# Patient Record
Sex: MALE | Race: WHITE
[De-identification: names, ages, dates, MRNs, and addresses within clinical notes are randomized per-mention and may not be internally consistent; named-entity substitution may affect disease eponyms.]

---

## 2020-03-14 ENCOUNTER — HOSPITAL ENCOUNTER (EMERGENCY)
Dept: HOSPITAL 56 - MW.ED | Age: 55
Discharge: HOME | End: 2020-03-14
Payer: COMMERCIAL

## 2020-03-14 DIAGNOSIS — I10: ICD-10-CM

## 2020-03-14 DIAGNOSIS — Z79.899: ICD-10-CM

## 2020-03-14 DIAGNOSIS — J18.9: Primary | ICD-10-CM

## 2020-03-14 DIAGNOSIS — Z53.20: ICD-10-CM

## 2020-03-14 LAB
BUN SERPL-MCNC: 10 MG/DL (ref 7–18)
CHLORIDE SERPL-SCNC: 102 MMOL/L (ref 98–107)
CO2 SERPL-SCNC: 26.9 MMOL/L (ref 21–32)
GLUCOSE SERPL-MCNC: 340 MG/DL (ref 74–106)
POTASSIUM SERPL-SCNC: 4.2 MMOL/L (ref 3.5–5.1)
SODIUM SERPL-SCNC: 139 MMOL/L (ref 136–148)

## 2020-03-14 PROCEDURE — 87804 INFLUENZA ASSAY W/OPTIC: CPT

## 2020-03-14 PROCEDURE — 71046 X-RAY EXAM CHEST 2 VIEWS: CPT

## 2020-03-14 PROCEDURE — 96365 THER/PROPH/DIAG IV INF INIT: CPT

## 2020-03-14 PROCEDURE — 96375 TX/PRO/DX INJ NEW DRUG ADDON: CPT

## 2020-03-14 PROCEDURE — 93005 ELECTROCARDIOGRAM TRACING: CPT

## 2020-03-14 PROCEDURE — 84484 ASSAY OF TROPONIN QUANT: CPT

## 2020-03-14 PROCEDURE — 99284 EMERGENCY DEPT VISIT MOD MDM: CPT

## 2020-03-14 PROCEDURE — 87880 STREP A ASSAY W/OPTIC: CPT

## 2020-03-14 PROCEDURE — 94640 AIRWAY INHALATION TREATMENT: CPT

## 2020-03-14 PROCEDURE — 36415 COLL VENOUS BLD VENIPUNCTURE: CPT

## 2020-03-14 PROCEDURE — 87081 CULTURE SCREEN ONLY: CPT

## 2020-03-14 PROCEDURE — 80053 COMPREHEN METABOLIC PANEL: CPT

## 2020-03-14 PROCEDURE — 85025 COMPLETE CBC W/AUTO DIFF WBC: CPT

## 2020-03-14 NOTE — EDM.PDOC
ED HPI GENERAL MEDICAL PROBLEM





- General


Chief Complaint: General


Stated Complaint: COLD


Time Seen by Provider: 03/14/20 11:18


Source of Information: Reports: Patient


History Limitations: Reports: No Limitations





- History of Present Illness


INITIAL COMMENTS - FREE TEXT/NARRATIVE: 


HISTORY AND PHYSICAL:





History of present illness:


Patient is a 55-year-old male who presents to the emergency room today with 

complaints of cough and chest tightness x2 weeks.  He states that he is 

coughing up foul-smelling sputum.  States he has had pneumonia in the past and 

is concerned if his respiratory symptoms are left untreated he could have 

pneumonia again.  Patient states he just had a occupational health evaluation 

and at that time "everything was fine".  He states that he did have borderline 

hypertension but states that he was not needing any form of treatment but was 

told to keep an eye on it.  Patient denies any fever, chills, headache, change 

in vision, syncope or near syncope. Denies any chest pain, back pain. Denies 

any abdominal pain, nausea, vomiting, diarrhea, constipation or dysuria. 

Patient has been eating and drinking appropriately.





Review of systems: 


As per history of present illness and below otherwise all systems reviewed and 

negative.





Past medical history: 


As per history of present illness and as reviewed below otherwise 

noncontributory.





Surgical history: 


As per history of present illness and as reviewed below otherwise 

noncontributory.





Social history: 


See social history for further information





Family history: 


As per history of present illness and as reviewed below otherwise 

noncontributory.





Physical exam:


General: Well-developed and well-nourished 55-year-old male.  Alert and 

oriented.  Nontoxic-appearing and in no acute distress.


HEENT: Atraumatic, normocephalic, pupils equal and reactive bilaterally, 

negative for conjunctival pallor or scleral icterus, mucous membranes moist, 

TMs normal bilaterally, throat clear, neck supple, nontender, trachea midline. 

No drooling or trismus noted. No meningeal signs. No hot potato voice noted. 


Lungs: Poor air exchange and diminished throughout, breath sounds equal 

bilaterally, chest nontender.


Heart: S1S2, regular rate and rhythm without overt murmur


Abdomen: Soft, nondistended, nontender. Negative for masses or 

hepatosplenomegaly. Negative for costovertebral tenderness.


Skin: Intact, warm, dry. No lesions or rashes noted.


Extremities: Atraumatic, moves all extremities per self without difficulty or 

deficits, negative for cords or calf pain. Neurovascular unremarkable.


Neuro: Awake, alert, oriented. Cranial nerves II through XII unremarkable. 

Cerebellum unremarkable. Motor and sensory unremarkable throughout. Exam 

nonfocal.





Notes:


With the recent COVID-19 outbreak patient has been screened: patient denies any 

recent travel to high risk areas or exposure to anyone who recently tested or 

is begin tested for COVID-19. Patient is at low/moderate risk (because absent 

fever and no travel).  With his influenza and chest x-ray reading is normal we 

did discuss testing for this (upper respiratory illness without definitive cause

).  He declines wanting any testing even though it was offered.





Not much lung improvement after the DuoNeb.  Patient does have a leukocytosis 

which I am going to treat as an atypical pneumonia.  I did request that we 

admit him for further care and management as his blood pressure is currently 

not under control.  He is refusing any further medications (refused IV labetolol

) and would like to be discharged to home.  We discussed in great lengths the 

risks of uncontrolled hypertension including but not limited to heart attack, 

stroke and even death.  He is aware of these risks and still would like to be 

discharged.  I am going to give him prescriptions for home and we reviewed and 

discussed signs and symptoms that would prompt him to return to the emergency 

room.





Diagnostics:


CBC, CMP, Troponin, EKG, CXR, Influneza, Strep





Therapeutics:


IV fluids, Duo Neb, Rocephin, Clonidine, Solu-Medrol





Prescription:


Zpak


Medrol Dosepak


Pro-Air Inhaler


Lisinopril





Impression: 


Atypical pneumonia


Uncontrolled hypertension


Left against medical advice





Plan:


1. Unmanaged blood pressure puts you at increased risk for heart attack and 

stroke. Your blood pressure is dangerously high. DO NOT take any over the 

counter cough and cold mediations as these can increase your blood pressure 

even more. I would like you to monitor your blood pressure routinely and be re-

evaluated by your primary care provider as your medication may need to be 

adjusted.


2. If your symptoms do not improve, new symptoms develop, or current symptoms 

worsen- RETURN to the Emergency Room. 


3. Alternate Tylenol and Ibuprofen as needed.


4. Follow up with primary care next week. Return to the ED as needed as 

discussed. 





Definitive disposition and diagnosis as appropriate pending reevaluation and 

review of above.








- Related Data


 Allergies











Allergy/AdvReac Type Severity Reaction Status Date / Time


 


No Known Allergies Allergy   Verified 03/14/20 11:26











Home Meds: 


 Home Meds





Albuterol Sulfate [Proair Hfa] 2 puff IH Q4HR #1 hfa.aer.ad 03/14/20 [Rx]


Azithromycin [Zithromax] 1 dose PO DAILY 5 Days #6 tab 03/14/20 [Rx]


lisinopriL [Lisinopril] 10 mg PO DAILY #30 tablet 03/14/20 [Rx]


methylPREDNISolone [Medrol] 1 dose PO DAILY 6 Days #1 dospk 03/14/20 [Rx]











Past Medical History





- Past Health History


Medical/Surgical History: Denies Medical/Surgical History





Social & Family History





- Family History


Family Medical History: Noncontributory





- Tobacco Use


Smoking Status *Q: Never Smoker





- Recreational Drug Use


Recreational Drug Use: No





ED ROS GENERAL





- Review of Systems


Review Of Systems: Comprehensive ROS is negative, except as noted in HPI.





ED EXAM, GENERAL





- Physical Exam


Exam: See Below (See dictation)





Course





- Vital Signs


Last Recorded V/S: 


 Last Vital Signs











Temp  97.8 F   03/14/20 13:24


 


Pulse  90   03/14/20 13:24


 


Resp  18   03/14/20 13:24


 


BP  179/104 H  03/14/20 13:24


 


Pulse Ox  94 L  03/14/20 13:24














- Orders/Labs/Meds


Orders: 


 Active Orders 24 hr











 Category Date Time Status


 


 EKG Documentation Completion [RC] STAT Care  03/14/20 11:35 Active


 


 RT Aerosol Therapy [RC] ASDIRECTED Care  03/14/20 11:41 Active


 


 CULTURE STREP A CONFIRMATION [RM] Stat Lab  03/14/20 11:32 Results


 


 STREP SCRN A RAPID W CULT CONF [RM] Stat Lab  03/14/20 11:32 Results


 


 Isolation [COMM] Routine Oth  03/14/20 11:32 Active


 


 Saline Lock Insert [OM.PC] Stat Oth  03/14/20 11:35 Ordered











Labs: 


 Laboratory Tests











  03/14/20 03/14/20 Range/Units





  11:55 11:55 


 


WBC  13.16 H   (4.0-11.0)  K/uL


 


RBC  5.13   (4.50-5.90)  M/uL


 


Hgb  15.7   (13.0-17.0)  g/dL


 


Hct  45.5   (38.0-50.0)  %


 


MCV  88.7   (80.0-98.0)  fL


 


MCH  30.6   (27.0-32.0)  pg


 


MCHC  34.5   (31.0-37.0)  g/dL


 


RDW Std Deviation  42.3   (28.0-62.0)  fl


 


RDW Coeff of Ashely  13   (11.0-15.0)  %


 


Plt Count  258   (150-400)  K/uL


 


MPV  9.30   (7.40-12.00)  fL


 


Neut % (Auto)  74.9   (48.0-80.0)  %


 


Lymph % (Auto)  16.0   (16.0-40.0)  %


 


Mono % (Auto)  6.8   (0.0-15.0)  %


 


Eos % (Auto)  2.1   (0.0-7.0)  %


 


Baso % (Auto)  0.2   (0.0-1.5)  %


 


Neut # (Auto)  9.9 H   (1.4-5.7)  K/uL


 


Lymph # (Auto)  2.1   (0.6-2.4)  K/uL


 


Mono # (Auto)  0.9 H   (0.0-0.8)  K/uL


 


Eos # (Auto)  0.3   (0.0-0.7)  K/uL


 


Baso # (Auto)  0.0   (0.0-0.1)  K/uL


 


Nucleated RBC %  0.0   /100WBC


 


Nucleated RBCs #  0   K/uL


 


Sodium   139  (136-148)  mmol/L


 


Potassium   4.2  (3.5-5.1)  mmol/L


 


Chloride   102  ()  mmol/L


 


Carbon Dioxide   26.9  (21.0-32.0)  mmol/L


 


BUN   10  (7.0-18.0)  mg/dL


 


Creatinine   0.9  (0.8-1.3)  mg/dL


 


Est Cr Clr Drug Dosing   110.36  mL/min


 


Estimated GFR (MDRD)   > 60.0  ml/min


 


Glucose   340 H  ()  mg/dL


 


Calcium   8.7  (8.5-10.1)  mg/dL


 


Total Bilirubin   0.7  (0.2-1.0)  mg/dL


 


AST   45 H  (15-37)  IU/L


 


ALT   98 H  (14-63)  IU/L


 


Alkaline Phosphatase   66  ()  U/L


 


Troponin I   < 0.050  (0.000-0.056)  ng/mL


 


Total Protein   7.5  (6.4-8.2)  g/dL


 


Albumin   3.3 L  (3.4-5.0)  g/dL


 


Globulin   4.2 H  (2.6-4.0)  g/dL


 


Albumin/Globulin Ratio   0.8 L  (0.9-1.6)  











Meds: 


Medications














Discontinued Medications














Generic Name Dose Route Start Last Admin





  Trade Name Freq  PRN Reason Stop Dose Admin


 


Albuterol/Ipratropium  3 ml  03/14/20 11:41  03/14/20 11:49





  Duoneb 3.0-0.5 Mg/3 Ml  NEB  03/14/20 11:42  3 ml





  ONETIME ONE   Administration





     





     





     





     


 


Clonidine HCl  0.1 mg  03/14/20 12:26  03/14/20 12:33





  Catapres  PO  03/14/20 12:27  0.1 mg





  ONETIME ONE   Administration





     





     





     





     


 


Ceftriaxone Sodium/Dextrose 1  50 mls @ 100 mls/hr  03/14/20 12:25  03/14/20 12:

33





  gm/ Premix  IV  03/14/20 12:54  100 mls/hr





  ONETIME ONE   Administration





     





     





     





     


 


Methylprednisolone Sodium Succinate  125 mg  03/14/20 12:25  03/14/20 12:33





  Solu-Medrol  IVPUSH  03/14/20 12:26  125 mg





  ONETIME ONE   Administration





     





     





     





     


 


Sodium Chloride  10 ml  03/14/20 11:35  03/14/20 12:00





  Saline Flush  FLUSH   10 ml





  ASDIRECTED PRN   Administration





  Keep Vein Open   





     





     





     


 


Sodium Chloride  2.5 ml  03/14/20 11:35  03/14/20 12:00





  Saline Flush  FLUSH   2.5 ml





  ASDIRECTED PRN   Administration





  Keep Vein Open   





     





     





     














Departure





- Departure


Time of Disposition: 12:50


Disposition: Home, Self-Care 01


Clinical Impression: 


 Left against medical advice, Uncontrolled hypertension, Atypical pneumonia








- Discharge Information


Prescriptions: 


Albuterol Sulfate [Proair Hfa] 2 puff IH Q4HR #1 hfa.aer.ad


Azithromycin [Zithromax] 1 dose PO DAILY 5 Days #6 tab


lisinopriL [Lisinopril] 10 mg PO DAILY #30 tablet


methylPREDNISolone [Medrol] 1 dose PO DAILY 6 Days #1 dospk


Instructions:  Hypertension, Easy-to-Read, Community-Acquired Pneumonia, Adult, 

Easy-to-Read


Referrals: 


PCP,None [Primary Care Provider] - 


Forms:  ED Department Discharge


Additional Instructions: 


The following information is given to patients seen in the emergency department 

who are being discharged to home. This information is to outline your options 

for follow-up care. We provide all patients seen in our emergency department 

with a follow-up referral.





The need for follow-up, as well as the timing and circumstances, are variable 

depending upon the specifics of your emergency department visit.





If you don't have a primary care physician on staff, we will provide you with a 

referral. We always advise you to contact your personal physician following an 

emergency department visit to inform them of the circumstance of the visit and 

for follow-up with them and/or the need for any referrals to a consulting 

specialist.





The emergency department will also refer you to a specialist when appropriate. 

This referral assures that you have the opportunity for follow-up care with a 

specialist. All of these measure are taken in an effort to provide you with 

optimal care, which includes your follow-up.





Under all circumstances we always encourage you to contact your private 

physician who remains a resource for coordinating your care. When calling for 

follow-up care, please make the office aware that this follow-up is from your 

recent emergency room visit. If for any reason you are refused follow-up, 

please contact the [facility] Emergency 

Department at [phone] and asked to speak to the emergency department 

charge nurse.





[facility]


Primary Care


[address] [place] [id number]


Phone: [phone]


Fax: [phone]





[address] [address]


Phone: [phone]


Fax: [phone]





1. Unmanaged blood pressure puts you at increased risk for heart attack and 

stroke. Your blood pressure is dangerously high. DO NOT take any over the 

counter cough and cold mediations as these can increase your blood pressure 

even more. I would like you to monitor your blood pressure routinely and be re-

evaluated by your primary care provider as your medication may need to be 

adjusted.


2. If your symptoms do not improve, new symptoms develop, or current symptoms 

worsen- RETURN to the Emergency Room. 


3. Alternate Tylenol and Ibuprofen as needed.


4. Follow up with primary care next week. Return to the ED as needed as 

discussed. 





Sepsis Event Note





- Evaluation


Sepsis Screening Result: No Definite Risk





- Focused Exam


Vital Signs: 


 Vital Signs











  Temp Temp Pulse Resp BP BP Pulse Ox


 


 03/14/20 13:24  97.8 F   90  18   179/104 H  94 L


 


 03/14/20 12:33      198/118 H  


 


 03/14/20 12:23    98  18   198/118 H  94 L


 


 03/14/20 12:22  97.8 F   111 H  21 H   201/125 H  94 L


 


 03/14/20 12:05   98.3 F  100  17   182/111 H  94 L


 


 03/14/20 11:38    90    190/118 H  93 L


 


 03/14/20 11:23   96.3 F L  95  18   189/120 H  94 L











Date Exam was Performed: 03/14/20


Time Exam was Performed: 17:36





- My Orders


Last 24 Hours: 


My Active Orders





03/14/20 11:32


CULTURE STREP A CONFIRMATION [RM] Stat 


STREP SCRN A RAPID W CULT CONF [RM] Stat 


Isolation [COMM] Routine 





03/14/20 11:35


EKG Documentation Completion [RC] STAT 


Saline Lock Insert [OM.PC] Stat 





03/14/20 11:41


RT Aerosol Therapy [RC] ASDIRECTED 














- Assessment/Plan


Last 24 Hours: 


My Active Orders





03/14/20 11:32


CULTURE STREP A CONFIRMATION [RM] Stat 


STREP SCRN A RAPID W CULT CONF [RM] Stat 


Isolation [COMM] Routine 





03/14/20 11:35


EKG Documentation Completion [RC] STAT 


Saline Lock Insert [OM.PC] Stat 





03/14/20 11:41


RT Aerosol Therapy [RC] ASDIRECTED

## 2020-03-14 NOTE — CR
Chest: 2 views of the chest were obtained.

 

Comparison: No prior chest imaging is available.

 

Heart size and mediastinum are normal.  Lungs are clear with no acute 

parenchymal change.  Slight scoliosis and degenerative change is noted

 within the spine.

 

Impression:

1.  Nothing acute is appreciated on 2 view chest x-ray.

 

Diagnostic code #2

 

This report was dictated in MDT